# Patient Record
Sex: MALE | Race: WHITE | NOT HISPANIC OR LATINO | Employment: FULL TIME | URBAN - NONMETROPOLITAN AREA
[De-identification: names, ages, dates, MRNs, and addresses within clinical notes are randomized per-mention and may not be internally consistent; named-entity substitution may affect disease eponyms.]

---

## 2023-06-09 ENCOUNTER — OFFICE VISIT (OUTPATIENT)
Dept: PRIMARY CARE | Facility: CLINIC | Age: 23
End: 2023-06-09
Payer: COMMERCIAL

## 2023-06-09 VITALS
HEIGHT: 74 IN | OXYGEN SATURATION: 98 % | SYSTOLIC BLOOD PRESSURE: 138 MMHG | WEIGHT: 238 LBS | DIASTOLIC BLOOD PRESSURE: 90 MMHG | BODY MASS INDEX: 30.54 KG/M2 | HEART RATE: 60 BPM

## 2023-06-09 DIAGNOSIS — R35.0 INCREASED URINARY FREQUENCY: ICD-10-CM

## 2023-06-09 DIAGNOSIS — R42 LIGHTHEADED: Primary | ICD-10-CM

## 2023-06-09 DIAGNOSIS — R42 DIZZINESS: ICD-10-CM

## 2023-06-09 DIAGNOSIS — R06.02 SHORTNESS OF BREATH: ICD-10-CM

## 2023-06-09 DIAGNOSIS — Z00.00 HEALTHCARE MAINTENANCE: ICD-10-CM

## 2023-06-09 PROCEDURE — 1036F TOBACCO NON-USER: CPT | Performed by: FAMILY MEDICINE

## 2023-06-09 PROCEDURE — 99203 OFFICE O/P NEW LOW 30 MIN: CPT | Performed by: FAMILY MEDICINE

## 2023-06-09 ASSESSMENT — PATIENT HEALTH QUESTIONNAIRE - PHQ9
SUM OF ALL RESPONSES TO PHQ9 QUESTIONS 1 AND 2: 0
2. FEELING DOWN, DEPRESSED OR HOPELESS: NOT AT ALL
1. LITTLE INTEREST OR PLEASURE IN DOING THINGS: NOT AT ALL

## 2023-06-09 NOTE — PROGRESS NOTES
Subjective   Tyrone Guevara is a 23 y.o. male who presents for Establish Care.  Here to get established.  He states that when he eats sugar he gets dizzy, lightheaded.  He did black out for a few seconds one time - was not feeling well and was lying in his truck with the air conditioning on.  He has been trying to avoid sweets.  Has been a couple of years but worse recently.  He is urinating frequently, no significant increase in thirst/hunger.  Has felt some brain fog.  Feels like his heart races sometimes when he has these episodes.    He also has some issues with shortness of breath - especially when it is warm.  Has not ever used an inhaler.  Has not had any lung function testing done.  We will get some PFTs.              Objective   Visit Vitals  /90   Pulse 60      Physical Exam  Vitals reviewed.   Constitutional:       General: He is not in acute distress.  Cardiovascular:      Rate and Rhythm: Normal rate and regular rhythm.      Heart sounds: No murmur heard.  Pulmonary:      Effort: Pulmonary effort is normal. No respiratory distress.      Breath sounds: Normal breath sounds.   Skin:     General: Skin is warm and dry.   Neurological:      General: No focal deficit present.      Mental Status: He is alert. Mental status is at baseline.         Assessment/Plan   Problem List Items Addressed This Visit    None  Visit Diagnoses       Lightheaded    -  Primary    Relevant Orders    CBC and Auto Differential    Comprehensive Metabolic Panel    Hemoglobin A1C    Lipid Panel    TSH with reflex to Free T4 if abnormal    Vitamin B12    Follow Up In Primary Care    Increased urinary frequency        Relevant Orders    CBC and Auto Differential    Comprehensive Metabolic Panel    Hemoglobin A1C    Lipid Panel    TSH with reflex to Free T4 if abnormal    Vitamin B12    Urinalysis with Reflex Microscopic    Follow Up In Primary Care    Healthcare maintenance        Relevant Orders    CBC and Auto Differential     Comprehensive Metabolic Panel    Hemoglobin A1C    Lipid Panel    TSH with reflex to Free T4 if abnormal    Vitamin B12    Follow Up In Primary Care    Shortness of breath        Relevant Orders    Pulmonary function testing    Follow Up In Primary Care    Dizziness        Relevant Orders    Follow Up In Primary Care               Sarah Garner MD

## 2023-06-10 ENCOUNTER — LAB (OUTPATIENT)
Dept: LAB | Facility: LAB | Age: 23
End: 2023-06-10
Payer: COMMERCIAL

## 2023-06-10 DIAGNOSIS — R35.0 INCREASED URINARY FREQUENCY: ICD-10-CM

## 2023-06-10 DIAGNOSIS — R42 LIGHTHEADED: ICD-10-CM

## 2023-06-10 DIAGNOSIS — Z00.00 HEALTHCARE MAINTENANCE: ICD-10-CM

## 2023-06-10 LAB
ALANINE AMINOTRANSFERASE (SGPT) (U/L) IN SER/PLAS: 33 U/L (ref 10–52)
ALBUMIN (G/DL) IN SER/PLAS: 4.6 G/DL (ref 3.4–5)
ALKALINE PHOSPHATASE (U/L) IN SER/PLAS: 71 U/L (ref 33–120)
ANION GAP IN SER/PLAS: 9 MMOL/L (ref 10–20)
APPEARANCE, URINE: CLEAR
ASPARTATE AMINOTRANSFERASE (SGOT) (U/L) IN SER/PLAS: 16 U/L (ref 9–39)
BASOPHILS (10*3/UL) IN BLOOD BY AUTOMATED COUNT: 0.02 X10E9/L (ref 0–0.1)
BASOPHILS/100 LEUKOCYTES IN BLOOD BY AUTOMATED COUNT: 0.3 % (ref 0–2)
BILIRUBIN TOTAL (MG/DL) IN SER/PLAS: 0.6 MG/DL (ref 0–1.2)
BILIRUBIN, URINE: NEGATIVE
BLOOD, URINE: NEGATIVE
CALCIUM (MG/DL) IN SER/PLAS: 10.1 MG/DL (ref 8.6–10.3)
CARBON DIOXIDE, TOTAL (MMOL/L) IN SER/PLAS: 30 MMOL/L (ref 21–32)
CHLORIDE (MMOL/L) IN SER/PLAS: 103 MMOL/L (ref 98–107)
CHOLESTEROL (MG/DL) IN SER/PLAS: 167 MG/DL (ref 0–199)
CHOLESTEROL IN HDL (MG/DL) IN SER/PLAS: 46 MG/DL
CHOLESTEROL/HDL RATIO: 3.6
COBALAMIN (VITAMIN B12) (PG/ML) IN SER/PLAS: 383 PG/ML (ref 211–911)
COLOR, URINE: YELLOW
CREATININE (MG/DL) IN SER/PLAS: 0.96 MG/DL (ref 0.5–1.3)
EOSINOPHILS (10*3/UL) IN BLOOD BY AUTOMATED COUNT: 0.09 X10E9/L (ref 0–0.7)
EOSINOPHILS/100 LEUKOCYTES IN BLOOD BY AUTOMATED COUNT: 1.4 % (ref 0–6)
ERYTHROCYTE DISTRIBUTION WIDTH (RATIO) BY AUTOMATED COUNT: 13.2 % (ref 11.5–14.5)
ERYTHROCYTE MEAN CORPUSCULAR HEMOGLOBIN CONCENTRATION (G/DL) BY AUTOMATED: 33.3 G/DL (ref 32–36)
ERYTHROCYTE MEAN CORPUSCULAR VOLUME (FL) BY AUTOMATED COUNT: 88 FL (ref 80–100)
ERYTHROCYTES (10*6/UL) IN BLOOD BY AUTOMATED COUNT: 5.34 X10E12/L (ref 4.5–5.9)
ESTIMATED AVERAGE GLUCOSE FOR HBA1C: 105 MG/DL
GFR MALE: >90 ML/MIN/1.73M2
GLUCOSE (MG/DL) IN SER/PLAS: 97 MG/DL (ref 74–99)
GLUCOSE, URINE: NEGATIVE MG/DL
HEMATOCRIT (%) IN BLOOD BY AUTOMATED COUNT: 46.8 % (ref 41–52)
HEMOGLOBIN (G/DL) IN BLOOD: 15.6 G/DL (ref 13.5–17.5)
HEMOGLOBIN A1C/HEMOGLOBIN TOTAL IN BLOOD: 5.3 %
IMMATURE GRANULOCYTES/100 LEUKOCYTES IN BLOOD BY AUTOMATED COUNT: 0.2 % (ref 0–0.9)
KETONES, URINE: NEGATIVE MG/DL
LDL: 90 MG/DL (ref 0–119)
LEUKOCYTE ESTERASE, URINE: NEGATIVE
LEUKOCYTES (10*3/UL) IN BLOOD BY AUTOMATED COUNT: 6.2 X10E9/L (ref 4.4–11.3)
LYMPHOCYTES (10*3/UL) IN BLOOD BY AUTOMATED COUNT: 2.19 X10E9/L (ref 1.2–4.8)
LYMPHOCYTES/100 LEUKOCYTES IN BLOOD BY AUTOMATED COUNT: 35.1 % (ref 13–44)
MONOCYTES (10*3/UL) IN BLOOD BY AUTOMATED COUNT: 0.46 X10E9/L (ref 0.1–1)
MONOCYTES/100 LEUKOCYTES IN BLOOD BY AUTOMATED COUNT: 7.4 % (ref 2–10)
NEUTROPHILS (10*3/UL) IN BLOOD BY AUTOMATED COUNT: 3.47 X10E9/L (ref 1.2–7.7)
NEUTROPHILS/100 LEUKOCYTES IN BLOOD BY AUTOMATED COUNT: 55.6 % (ref 40–80)
NITRITE, URINE: NEGATIVE
PH, URINE: 5 (ref 5–8)
PLATELETS (10*3/UL) IN BLOOD AUTOMATED COUNT: 280 X10E9/L (ref 150–450)
POTASSIUM (MMOL/L) IN SER/PLAS: 4.8 MMOL/L (ref 3.5–5.3)
PROTEIN TOTAL: 7.2 G/DL (ref 6.4–8.2)
PROTEIN, URINE: NEGATIVE MG/DL
SODIUM (MMOL/L) IN SER/PLAS: 137 MMOL/L (ref 136–145)
SPECIFIC GRAVITY, URINE: 1.02 (ref 1–1.03)
THYROTROPIN (MIU/L) IN SER/PLAS BY DETECTION LIMIT <= 0.05 MIU/L: 0.79 MIU/L (ref 0.44–3.98)
TRIGLYCERIDE (MG/DL) IN SER/PLAS: 154 MG/DL (ref 0–149)
UREA NITROGEN (MG/DL) IN SER/PLAS: 15 MG/DL (ref 6–23)
UROBILINOGEN, URINE: <2 MG/DL (ref 0–1.9)
VLDL: 31 MG/DL (ref 0–40)

## 2023-06-10 PROCEDURE — 84443 ASSAY THYROID STIM HORMONE: CPT

## 2023-06-10 PROCEDURE — 83036 HEMOGLOBIN GLYCOSYLATED A1C: CPT

## 2023-06-10 PROCEDURE — 80061 LIPID PANEL: CPT

## 2023-06-10 PROCEDURE — 82607 VITAMIN B-12: CPT

## 2023-06-10 PROCEDURE — 80053 COMPREHEN METABOLIC PANEL: CPT

## 2023-06-10 PROCEDURE — 81003 URINALYSIS AUTO W/O SCOPE: CPT

## 2023-06-10 PROCEDURE — 36415 COLL VENOUS BLD VENIPUNCTURE: CPT

## 2023-06-10 PROCEDURE — 85025 COMPLETE CBC W/AUTO DIFF WBC: CPT

## 2023-07-07 ENCOUNTER — OFFICE VISIT (OUTPATIENT)
Dept: PRIMARY CARE | Facility: CLINIC | Age: 23
End: 2023-07-07
Payer: COMMERCIAL

## 2023-07-07 VITALS
HEART RATE: 65 BPM | HEIGHT: 74 IN | BODY MASS INDEX: 30.66 KG/M2 | WEIGHT: 238.9 LBS | DIASTOLIC BLOOD PRESSURE: 62 MMHG | OXYGEN SATURATION: 97 % | SYSTOLIC BLOOD PRESSURE: 100 MMHG

## 2023-07-07 DIAGNOSIS — Z00.00 HEALTHCARE MAINTENANCE: ICD-10-CM

## 2023-07-07 DIAGNOSIS — R42 LIGHTHEADED: ICD-10-CM

## 2023-07-07 DIAGNOSIS — R35.0 INCREASED URINARY FREQUENCY: ICD-10-CM

## 2023-07-07 DIAGNOSIS — R06.02 SHORTNESS OF BREATH: ICD-10-CM

## 2023-07-07 DIAGNOSIS — R42 DIZZINESS: ICD-10-CM

## 2023-07-07 PROCEDURE — 99213 OFFICE O/P EST LOW 20 MIN: CPT | Performed by: FAMILY MEDICINE

## 2023-07-07 PROCEDURE — 1036F TOBACCO NON-USER: CPT | Performed by: FAMILY MEDICINE

## 2023-07-07 RX ORDER — ALBUTEROL SULFATE 90 UG/1
2 AEROSOL, METERED RESPIRATORY (INHALATION) EVERY 4 HOURS PRN
Qty: 8 G | Refills: 5 | Status: SHIPPED | OUTPATIENT
Start: 2023-07-07 | End: 2024-07-06

## 2023-07-07 NOTE — PROGRESS NOTES
Franco Guevara is a 23 y.o. male who presents for No chief complaint on file..  Here for follow up recent testing,  He states that he is feeling about the same.  He was unable to get the PFTs done due to cost.  We discussed the possibility of some mild asthma based on his symptoms and we will try some albuterol prn to see if that helps.  We reviewed his labs and urine test and those were all ok.              Objective   Visit Vitals  /62 (BP Location: Left arm, Patient Position: Sitting, BP Cuff Size: Adult)   Pulse 65      Physical Exam  Vitals reviewed.   Constitutional:       General: He is not in acute distress.  Cardiovascular:      Rate and Rhythm: Normal rate and regular rhythm.      Heart sounds: No murmur heard.  Pulmonary:      Effort: Pulmonary effort is normal. No respiratory distress.      Breath sounds: Normal breath sounds.   Skin:     General: Skin is warm and dry.   Neurological:      General: No focal deficit present.      Mental Status: He is alert. Mental status is at baseline.        Latest Reference Range & Units 06/10/23 09:15   GLUCOSE 74 - 99 mg/dL 97   SODIUM 136 - 145 mmol/L 137   POTASSIUM 3.5 - 5.3 mmol/L 4.8   CHLORIDE 98 - 107 mmol/L 103   Bicarbonate 21 - 32 mmol/L 30   Anion Gap 10 - 20 mmol/L 9 (L)   Blood Urea Nitrogen 6 - 23 mg/dL 15   Creatinine 0.50 - 1.30 mg/dL 0.96   Calcium 8.6 - 10.3 mg/dL 10.1   Albumin 3.4 - 5.0 g/dL 4.6   Alkaline Phosphatase 33 - 120 U/L 71   ALT 10 - 52 U/L 33   AST 9 - 39 U/L 16   Bilirubin Total 0.0 - 1.2 mg/dL 0.6   HDL CHOLESTEROL mg/dL 46.0   Cholesterol/HDL Ratio  3.6   VLDL 0 - 40 mg/dL 31   TRIGLYCERIDES 0 - 149 mg/dL 154 (H)   Total Protein 6.4 - 8.2 g/dL 7.2   CHOLESTEROL 0 - 199 mg/dL 167   LDL 0 - 119 mg/dL 90   GFR MALE >90 mL/min/1.73m2 >90   Vitamin B12 211 - 911 pg/mL 383   Hemoglobin A1C % 5.3   Thyroid Stimulating Hormone 0.44 - 3.98 mIU/L 0.79   Estimated Average Glucose MG/   WBC 4.4 - 11.3 x10E9/L 6.2    RBC 4.50 - 5.90 x10E12/L 5.34   HEMOGLOBIN 13.5 - 17.5 g/dL 15.6   HEMATOCRIT 41.0 - 52.0 % 46.8   MCV 80 - 100 fL 88   MCHC 32.0 - 36.0 g/dL 33.3   Platelets 150 - 450 x10E9/L 280   Neutrophils % 40.0 - 80.0 % 55.6   Immature Granulocytes %, Automated 0.0 - 0.9 % 0.2   Lymphocytes % 13.0 - 44.0 % 35.1   Monocytes % 2.0 - 10.0 % 7.4   Eosinophils % 0.0 - 6.0 % 1.4   Basophils % 0.0 - 2.0 % 0.3   Neutrophils Absolute 1.20 - 7.70 x10E9/L 3.47   Lymphocytes Absolute 1.20 - 4.80 x10E9/L 2.19   Monocytes Absolute 0.10 - 1.00 x10E9/L 0.46   Eosinophils Absolute 0.00 - 0.70 x10E9/L 0.09   Basophils Absolute 0.00 - 0.10 x10E9/L 0.02   RED CELL DISTRIBUTION WIDTH 11.5 - 14.5 % 13.2   (L): Data is abnormally low  (H): Data is abnormally high     Latest Reference Range & Units 06/10/23 09:23   Color, Urine STRAW,YELLOW  Yellow   Appearance, Urine CLEAR  CLEAR   Specific Gravity, Urine 1.005 - 1.035  1.018   pH, Urine 5.0 - 8.0  5.0   Protein, Urine NEGATIVE mg/dL NEGATIVE   Glucose, Urine NEGATIVE mg/dL NEGATIVE   Blood, Urine NEGATIVE  NEGATIVE   Ketones, Urine NEGATIVE mg/dL NEGATIVE   Bilirubin, Urine NEGATIVE  NEGATIVE   Urobilinogen, Urine 0.0 - 1.9 mg/dL <2.0   Nitrite, Urine NEGATIVE  Negative   Leukocyte Esterase, Urine NEGATIVE  NEGATIVE       Assessment/Plan   Problem List Items Addressed This Visit    None  Visit Diagnoses       Lightheaded        Increased urinary frequency        Healthcare maintenance        Shortness of breath        Dizziness                   Sarah Garner MD

## 2023-07-07 NOTE — PROGRESS NOTES
Subjective   Patient ID: Tyrone Guevara is a 23 y.o. male who presents to go over lab work  No chief complaint on file..    HPI     Review of Systems    Objective   There were no vitals taken for this visit.    Physical Exam    Assessment/Plan

## 2025-03-10 ENCOUNTER — OFFICE VISIT (OUTPATIENT)
Dept: URGENT CARE | Facility: CLINIC | Age: 25
End: 2025-03-10
Payer: COMMERCIAL

## 2025-03-10 VITALS
DIASTOLIC BLOOD PRESSURE: 81 MMHG | SYSTOLIC BLOOD PRESSURE: 145 MMHG | TEMPERATURE: 96.4 F | RESPIRATION RATE: 15 BRPM | HEART RATE: 78 BPM | OXYGEN SATURATION: 98 %

## 2025-03-10 DIAGNOSIS — L03.011 PARONYCHIA OF FINGER OF RIGHT HAND: Primary | ICD-10-CM

## 2025-03-10 PROCEDURE — 99213 OFFICE O/P EST LOW 20 MIN: CPT | Performed by: NURSE PRACTITIONER

## 2025-03-10 RX ORDER — BACITRACIN 500 [USP'U]/G
1 OINTMENT TOPICAL 3 TIMES DAILY
Qty: 28.4 G | Refills: 0 | Status: SHIPPED | OUTPATIENT
Start: 2025-03-10 | End: 2025-03-17

## 2025-03-10 RX ORDER — SULFAMETHOXAZOLE AND TRIMETHOPRIM 800; 160 MG/1; MG/1
1 TABLET ORAL 2 TIMES DAILY
Qty: 20 TABLET | Refills: 0 | Status: SHIPPED | OUTPATIENT
Start: 2025-03-10 | End: 2025-03-20

## 2025-03-10 NOTE — PROGRESS NOTES
[facility]   SUREKHA NOTE:      Name: Tyrone Guevara, 25 y.o.    CSN:[id number]   MRN:[id number]    PCP: Sarah Garner MD    ALL:    Allergies   Allergen Reactions    Amoxicillin Hives and Other       History:    Chief Complaint: R finger pain (swelling,  redness x 2 weeks)    Encounter Date: 3/10/2025      HPI: The history was obtained from the patient. Tyrone is a 25 y.o. male, who presents with a chief complaint of R finger pain (swelling,  redness x 2 weeks) right fourth digit inflammation around the nailbed that began approximately 2 weeks ago, worsening over the last 1 to 2 days without purulent drainage.  Surrounding tissue red, warm, swollen and painful.  Utilizing warm water soaks and gloves while at work.  Denies any fevers, chills, body aches    PMHx:    No past medical history on file.           Current Outpatient Medications   Medication Sig Dispense Refill    bacitracin 500 unit/gram ointment Apply 1 Application topically 3 times a day for 7 days. 28.4 g 0    sulfamethoxazole-trimethoprim (Bactrim DS) 800-160 mg tablet Take 1 tablet by mouth 2 times a day for 10 days. 20 tablet 0     No current facility-administered medications for this visit.         PMSx:    Past Surgical History:   Procedure Laterality Date    EYE SURGERY         Fam Hx:   Family History   Problem Relation Name Age of Onset    Diabetes Mother      Hypertension Mother      Diabetes Sister      Hypertension Sister         SOC. Hx:     Social History     Socioeconomic History    Marital status:      Spouse name: Not on file    Number of children: Not on file    Years of education: Not on file    Highest education level: Not on file   Occupational History    Not on file   Tobacco Use    Smoking status: Former     Types: Cigarettes    Smokeless tobacco: Never   Vaping Use    Vaping status: Never Used   Substance and Sexual Activity    Alcohol use: Never    Drug use: Never    Sexual  activity: Not on file   Other Topics Concern    Not on file   Social History Narrative    Not on file     Social Drivers of Health     Financial Resource Strain: Not on file   Food Insecurity: Not on file   Transportation Needs: Not on file   Physical Activity: Not on file   Stress: Not on file   Social Connections: Not on file   Intimate Partner Violence: Not on file   Housing Stability: Not on file         Vitals:    03/10/25 0914   BP: 145/81   Pulse: 78   Resp: 15   Temp: 35.8 °C (96.4 °F)   SpO2: 98%                Physical Exam  Vitals and nursing note reviewed.   Constitutional:       General: He is not in acute distress.     Appearance: Normal appearance. He is not ill-appearing.   HENT:      Head: Normocephalic and atraumatic.      Mouth/Throat:      Mouth: Mucous membranes are moist.   Cardiovascular:      Rate and Rhythm: Normal rate and regular rhythm.      Heart sounds: Normal heart sounds.   Pulmonary:      Effort: Pulmonary effort is normal.      Breath sounds: Normal breath sounds.   Abdominal:      General: Bowel sounds are normal.   Skin:     General: Skin is warm and dry.      Capillary Refill: Capillary refill takes less than 2 seconds.      Comments: Right fourth digit nailbed with surrounding tissue redness, warmth, and edema   Neurological:      Mental Status: He is alert and oriented to person, place, and time.         ____________________________________________________________________    I did personally review Tyrone's past medical history, surgical history, social history, as well as family history (when relevant).  In this case, I also oversaw the his drug management by reviewing his medication list, allergy list, as well as the medications that I prescribed during the UC course and/or recommended as an out-patient (including possible OTC medications such as acetaminophen, NSAIDs , etc).    After reviewing the items above, I did look at previous medical documentation, such as recent  hospitalizations, office visits, and/or recent consultations with PCP/specialist.                          SDOH:   Another factor that I considered in Tyrone's care was his Social Determinants of Health (SDOH). During this UC encounter, he did not have social determinants of health. Those SDOH influencing Tyrone's care are: none      _____________________________________________________________________       COURSE/MEDICAL DECISION MAKING:    Tyrone is a 25 y.o., who presents with a working diagnosis of   1. Paronychia of finger of right hand        Tyrone was seen today for r finger pain.  Diagnoses and all orders for this visit:  Paronychia of finger of right hand (Primary)  -     bacitracin 500 unit/gram ointment; Apply 1 Application topically 3 times a day for 7 days.  -     sulfamethoxazole-trimethoprim (Bactrim DS) 800-160 mg tablet; Take 1 tablet by mouth 2 times a day for 10 days.         Plan of care reviewed with patient.  If symptoms change and/or worsen will follow-up with primary care provider, return to urgent care, or go to the nearest emergency department for further evaluation.  Patient states understanding and is agreeable with plan of care.      PATTIE Johnson, DNP   Advanced Practice Provider  [facility]    Please note: While the patient may or may not have received printed discharge paperwork, all relevant medical findings, test results, and treatment details are accessible through the electronic medical record system. The patient is encouraged to review their chart via the patient portal for comprehensive information and follow-up instructions.